# Patient Record
(demographics unavailable — no encounter records)

---

## 2025-05-09 NOTE — CONSULT LETTER
[FreeTextEntry1] :   Dear Dr. JOSSELYN LIU,   Our mutual patient, ROLAN CORTEZ underwent surgery today as outlined below. The procedure went well and he was discharged from the PACU after an uneventful stay. Discharge instructions were provided in writing. Instructions regarding follow up were also provided.   Sincerely,   Chris Vora MD, FACS, FSPU Chief, Pediatric Urology Professor of Urology and Pediatrics [facility] [employer] [facility].   President-elect, [employer]

## 2025-05-09 NOTE — PROCEDURE
[FreeTextEntry3] :  CHORDEE CORRECTION DETORSION OF PENIS MANDEEP FLAPS FOR SKIN COVERAGE CIRCUMCISION [FreeTextEntry5] : NONE [FreeTextEntry6] :  BANDAGE X 48 HRS BACITRACIN EVERY DIAPER CHANGE AFTER BANDAGE OFF X 2 DAYS THEN SWITCH TO VASELINE/AQUAPHOR X 1 MONTH BATHE 72 HRS FU 2-3 WEEKS (PHOTO OK)